# Patient Record
Sex: FEMALE | Race: WHITE | NOT HISPANIC OR LATINO | Employment: OTHER | URBAN - METROPOLITAN AREA
[De-identification: names, ages, dates, MRNs, and addresses within clinical notes are randomized per-mention and may not be internally consistent; named-entity substitution may affect disease eponyms.]

---

## 2017-01-18 ENCOUNTER — TELEPHONE (OUTPATIENT)
Dept: INTERNAL MEDICINE | Facility: CLINIC | Age: 67
End: 2017-01-18

## 2017-01-18 DIAGNOSIS — E78.5 HYPERLIPIDEMIA, UNSPECIFIED HYPERLIPIDEMIA TYPE: ICD-10-CM

## 2017-01-18 RX ORDER — ATORVASTATIN CALCIUM 10 MG/1
10 TABLET, FILM COATED ORAL DAILY
Qty: 90 TABLET | Refills: 3 | Status: SHIPPED | OUTPATIENT
Start: 2017-01-18 | End: 2018-02-14 | Stop reason: SDUPTHER

## 2017-01-19 NOTE — TELEPHONE ENCOUNTER
Sent 90 day Rx for a year to her pharmacy per request from her employers [employer] generation Rx that maintenance meds be filled with 90 day prescriptions.

## 2017-02-07 DIAGNOSIS — F32.A DEPRESSION: ICD-10-CM

## 2017-02-07 RX ORDER — FLUOXETINE HYDROCHLORIDE 40 MG/1
CAPSULE ORAL
Qty: 90 CAPSULE | Refills: 3 | Status: SHIPPED | OUTPATIENT
Start: 2017-02-07 | End: 2018-02-05 | Stop reason: SDUPTHER

## 2017-02-11 DIAGNOSIS — E11.9 DIABETES MELLITUS TYPE 2, CONTROLLED: ICD-10-CM

## 2017-02-12 RX ORDER — METFORMIN HYDROCHLORIDE 500 MG/1
TABLET, EXTENDED RELEASE ORAL
Qty: 360 TABLET | Refills: 1 | Status: SHIPPED | OUTPATIENT
Start: 2017-02-12 | End: 2017-02-17 | Stop reason: SDUPTHER

## 2017-02-17 DIAGNOSIS — E11.9 DIABETES MELLITUS TYPE 2, CONTROLLED: ICD-10-CM

## 2017-02-17 RX ORDER — METFORMIN HYDROCHLORIDE 500 MG/1
TABLET, EXTENDED RELEASE ORAL
Qty: 360 TABLET | Refills: 0 | Status: SHIPPED | OUTPATIENT
Start: 2017-02-17 | End: 2017-05-05 | Stop reason: SDUPTHER

## 2017-03-27 DIAGNOSIS — E11.9 TYPE II OR UNSPECIFIED TYPE DIABETES MELLITUS WITHOUT MENTION OF COMPLICATION, NOT STATED AS UNCONTROLLED: ICD-10-CM

## 2017-03-27 RX ORDER — LINAGLIPTIN 5 MG/1
TABLET, FILM COATED ORAL
Qty: 90 TABLET | Refills: 1 | OUTPATIENT
Start: 2017-03-27

## 2017-05-05 DIAGNOSIS — E11.9 DIABETES MELLITUS TYPE 2, CONTROLLED: ICD-10-CM

## 2017-05-05 RX ORDER — METFORMIN HYDROCHLORIDE 500 MG/1
TABLET, EXTENDED RELEASE ORAL
Qty: 360 TABLET | Refills: 0 | Status: SHIPPED | OUTPATIENT
Start: 2017-05-05 | End: 2017-08-20 | Stop reason: SDUPTHER

## 2017-05-05 RX ORDER — CANAGLIFLOZIN 100 MG/1
TABLET, FILM COATED ORAL
Qty: 90 TABLET | Refills: 1 | Status: SHIPPED | OUTPATIENT
Start: 2017-05-05 | End: 2017-06-20 | Stop reason: SDUPTHER

## 2017-05-05 RX ORDER — LINAGLIPTIN 5 MG/1
TABLET, FILM COATED ORAL
Qty: 90 TABLET | Refills: 1 | Status: SHIPPED | OUTPATIENT
Start: 2017-05-05 | End: 2017-11-06 | Stop reason: SDUPTHER

## 2017-05-16 ENCOUNTER — CLINICAL SUPPORT (OUTPATIENT)
Dept: INTERNAL MEDICINE | Facility: CLINIC | Age: 67
End: 2017-05-16
Payer: MEDICARE

## 2017-05-16 DIAGNOSIS — E11.9 TYPE 2 DIABETES MELLITUS WITHOUT COMPLICATIONS: ICD-10-CM

## 2017-05-16 LAB
ALBUMIN SERPL BCP-MCNC: 3.7 G/DL
ALP SERPL-CCNC: 68 U/L
ALT SERPL W/O P-5'-P-CCNC: 14 U/L
ANION GAP SERPL CALC-SCNC: 12 MMOL/L
AST SERPL-CCNC: 17 U/L
BASOPHILS # BLD AUTO: 0.03 K/UL
BASOPHILS NFR BLD: 0.3 %
BILIRUB SERPL-MCNC: 0.4 MG/DL
BUN SERPL-MCNC: 11 MG/DL
CALCIUM SERPL-MCNC: 9.8 MG/DL
CHLORIDE SERPL-SCNC: 107 MMOL/L
CO2 SERPL-SCNC: 26 MMOL/L
CREAT SERPL-MCNC: 0.8 MG/DL
DIFFERENTIAL METHOD: ABNORMAL
EOSINOPHIL # BLD AUTO: 0.2 K/UL
EOSINOPHIL NFR BLD: 2.4 %
ERYTHROCYTE [DISTWIDTH] IN BLOOD BY AUTOMATED COUNT: 14.5 %
EST. GFR  (AFRICAN AMERICAN): >60 ML/MIN/1.73 M^2
EST. GFR  (NON AFRICAN AMERICAN): >60 ML/MIN/1.73 M^2
GLUCOSE SERPL-MCNC: 115 MG/DL
HCT VFR BLD AUTO: 43.1 %
HGB BLD-MCNC: 13.6 G/DL
LYMPHOCYTES # BLD AUTO: 2.1 K/UL
LYMPHOCYTES NFR BLD: 21.4 %
MCH RBC QN AUTO: 28.2 PG
MCHC RBC AUTO-ENTMCNC: 31.6 %
MCV RBC AUTO: 89 FL
MONOCYTES # BLD AUTO: 0.6 K/UL
MONOCYTES NFR BLD: 6 %
NEUTROPHILS # BLD AUTO: 6.8 K/UL
NEUTROPHILS NFR BLD: 69.7 %
PLATELET # BLD AUTO: 238 K/UL
PMV BLD AUTO: 11.4 FL
POTASSIUM SERPL-SCNC: 4.8 MMOL/L
PROT SERPL-MCNC: 7.1 G/DL
RBC # BLD AUTO: 4.83 M/UL
SODIUM SERPL-SCNC: 145 MMOL/L
WBC # BLD AUTO: 9.82 K/UL

## 2017-05-16 PROCEDURE — 85025 COMPLETE CBC W/AUTO DIFF WBC: CPT

## 2017-05-16 PROCEDURE — 83036 HEMOGLOBIN GLYCOSYLATED A1C: CPT

## 2017-05-16 PROCEDURE — 80053 COMPREHEN METABOLIC PANEL: CPT

## 2017-05-17 LAB
ESTIMATED AVG GLUCOSE: 123 MG/DL
HBA1C MFR BLD HPLC: 5.9 %

## 2017-05-23 DIAGNOSIS — E11.9 DIABETES MELLITUS TYPE 2, CONTROLLED: ICD-10-CM

## 2017-05-23 RX ORDER — METFORMIN HYDROCHLORIDE 500 MG/1
TABLET, EXTENDED RELEASE ORAL
Qty: 360 TABLET | Refills: 0 | OUTPATIENT
Start: 2017-05-23

## 2017-06-20 ENCOUNTER — OFFICE VISIT (OUTPATIENT)
Dept: INTERNAL MEDICINE | Facility: CLINIC | Age: 67
End: 2017-06-20
Payer: MEDICARE

## 2017-06-20 VITALS
SYSTOLIC BLOOD PRESSURE: 123 MMHG | WEIGHT: 188.25 LBS | BODY MASS INDEX: 26.35 KG/M2 | HEIGHT: 71 IN | OXYGEN SATURATION: 95 % | TEMPERATURE: 97 F | DIASTOLIC BLOOD PRESSURE: 75 MMHG | HEART RATE: 88 BPM

## 2017-06-20 DIAGNOSIS — M79.672 LEFT FOOT PAIN: ICD-10-CM

## 2017-06-20 DIAGNOSIS — Z23 IMMUNIZATION DUE: ICD-10-CM

## 2017-06-20 DIAGNOSIS — E11.9 TYPE 2 DIABETES MELLITUS WITHOUT COMPLICATION, WITHOUT LONG-TERM CURRENT USE OF INSULIN: Primary | ICD-10-CM

## 2017-06-20 DIAGNOSIS — E78.5 HYPERLIPIDEMIA, UNSPECIFIED HYPERLIPIDEMIA TYPE: ICD-10-CM

## 2017-06-20 DIAGNOSIS — M85.80 OSTEOPENIA, UNSPECIFIED LOCATION: ICD-10-CM

## 2017-06-20 PROCEDURE — 90732 PPSV23 VACC 2 YRS+ SUBQ/IM: CPT | Mod: PBBFAC,PO

## 2017-06-20 PROCEDURE — 99213 OFFICE O/P EST LOW 20 MIN: CPT | Mod: PBBFAC,PO | Performed by: FAMILY MEDICINE

## 2017-06-20 PROCEDURE — 1159F MED LIST DOCD IN RCRD: CPT | Mod: ,,, | Performed by: FAMILY MEDICINE

## 2017-06-20 PROCEDURE — 1126F AMNT PAIN NOTED NONE PRSNT: CPT | Mod: ,,, | Performed by: FAMILY MEDICINE

## 2017-06-20 PROCEDURE — 99999 PR PBB SHADOW E&M-EST. PATIENT-LVL III: CPT | Mod: PBBFAC,,, | Performed by: FAMILY MEDICINE

## 2017-06-20 PROCEDURE — 3044F HG A1C LEVEL LT 7.0%: CPT | Mod: ,,, | Performed by: FAMILY MEDICINE

## 2017-06-20 PROCEDURE — 99214 OFFICE O/P EST MOD 30 MIN: CPT | Mod: S$PBB,,, | Performed by: FAMILY MEDICINE

## 2017-06-20 RX ORDER — ACETAMINOPHEN 500 MG
1 TABLET ORAL DAILY
COMMUNITY
End: 2018-08-20

## 2017-06-20 RX ORDER — AMOXICILLIN 500 MG
2 CAPSULE ORAL DAILY
COMMUNITY
End: 2018-08-20

## 2017-06-20 NOTE — PROGRESS NOTES
Subjective:       Patient ID: Ynes Diaz is a 67 y.o. female.    Chief Complaint: discuss labs    Type 2 DM, HLD, familial neuropathy, depression. She is here to review her 6 month labs.  Lab Results       Component                Value               Date                       WBC                      9.82                05/16/2017                 HGB                      13.6                05/16/2017                 HCT                      43.1                05/16/2017                 PLT                      238                 05/16/2017                 CHOL                     157                 11/07/2016                 TRIG                     154 (H)             11/07/2016                 HDL                      38 (L)              11/07/2016                 LDLCALC                  88.2                11/07/2016                 ALT                      14                  05/16/2017                 AST                      17                  05/16/2017                 NA                       145                 05/16/2017                 K                        4.8                 05/16/2017                 CL                       107                 05/16/2017                 CALCIUM                  9.8                 05/16/2017                 CREATININE               0.8                 05/16/2017                 BUN                      11                  05/16/2017                 CO2                      26                  05/16/2017                 GLU                      115 (H)             05/16/2017                 ESTGFRAFRICA             >60.0               05/16/2017                 EGFRNONAA                >60.0               05/16/2017                 HGBA1C                   5.9                 05/16/2017                 MICALBCREAT                                  11/07/2016             Unable to calculate  A1C dwon from 6.2 to 5.9. On metformin, tradjenta and invokana.no  low BSs, no med SEs.    She had a BMD last fall and the results were never reviewed with pt. Testing rfeviewed in detail. She has T score to -1.1 at worst in femoral neck. No prior Vit D check      Review of Systems   Constitutional: Negative for fever.   Gastrointestinal: Negative for diarrhea.   Musculoskeletal: Positive for arthralgias (left foot).   Psychiatric/Behavioral: Negative for sleep disturbance.       Objective:      Physical Exam   Constitutional: She is oriented to person, place, and time. She appears well-developed and well-nourished.   HENT:   Head: Normocephalic and atraumatic.   Right Ear: External ear normal.   Left Ear: External ear normal.   Mouth/Throat: Oropharynx is clear and moist. No oropharyngeal exudate.   Neck: Normal range of motion. Neck supple.   Cardiovascular: Normal rate, regular rhythm and normal heart sounds.    Pulses:       Dorsalis pedis pulses are 2+ on the right side, and 2+ on the left side.        Posterior tibial pulses are 1+ on the right side, and 1+ on the left side.   Pulmonary/Chest: Effort normal and breath sounds normal.   Abdominal: Soft. Bowel sounds are normal. She exhibits no distension. There is no tenderness.   Musculoskeletal:        Right foot: There is normal range of motion and no deformity.        Left foot: There is normal range of motion and no deformity.   TTP to left prox 5th metatarsal laterally   Feet:   Right Foot:   Protective Sensation: 10 sites tested. 10 sites sensed.   Skin Integrity: Negative for ulcer or skin breakdown.   Left Foot:   Protective Sensation: 10 sites tested. 10 sites sensed.   Skin Integrity: Negative for ulcer or skin breakdown.   Neurological: She is alert and oriented to person, place, and time.   Skin: Skin is warm and dry.   Psychiatric: She has a normal mood and affect. Her behavior is normal.         Assessment/Plan:     1. Type 2 diabetes mellitus without complication, without long-term current use of insulin   canagliflozin (INVOKANA) 100 mg Tab    Hemoglobin A1c    Microalbumin/creatinine urine ratio    Comprehensive metabolic panel    Ambulatory referral to Podiatry   2. Hyperlipidemia, unspecified hyperlipidemia type  Lipid panel   3. Immunization due  Pneumococcal Polysaccharide Vaccine (23 Valent) (SQ/IM)   4. Osteopenia, unspecified location  Vitamin D   5. Left foot pain  Ambulatory referral to Podiatry     Reviewed BMD in depth with recs - will get Vit D level with next draw - calcium recs given.  Excellent control DM2 lipids, BP not elevated.

## 2017-07-10 ENCOUNTER — HOSPITAL ENCOUNTER (OUTPATIENT)
Dept: RADIOLOGY | Facility: HOSPITAL | Age: 67
Discharge: HOME | End: 2017-07-10
Attending: PODIATRIST
Payer: MEDICARE

## 2017-07-10 ENCOUNTER — OFFICE VISIT (OUTPATIENT)
Dept: PODIATRY | Facility: CLINIC | Age: 67
End: 2017-07-10
Payer: MEDICARE

## 2017-07-10 VITALS
BODY MASS INDEX: 26.35 KG/M2 | WEIGHT: 188.25 LBS | DIASTOLIC BLOOD PRESSURE: 69 MMHG | HEIGHT: 71 IN | HEART RATE: 83 BPM | SYSTOLIC BLOOD PRESSURE: 108 MMHG

## 2017-07-10 DIAGNOSIS — M86.9 PERIOSTITIS OF ANKLE AND FOOT: ICD-10-CM

## 2017-07-10 DIAGNOSIS — M79.672 LEFT FOOT PAIN: ICD-10-CM

## 2017-07-10 DIAGNOSIS — E11.9 COMPREHENSIVE DIABETIC FOOT EXAMINATION, TYPE 2 DM, ENCOUNTER FOR: Primary | ICD-10-CM

## 2017-07-10 PROCEDURE — 73630 X-RAY EXAM OF FOOT: CPT | Mod: TC,LT

## 2017-07-10 PROCEDURE — 99999 PR PBB SHADOW E&M-EST. PATIENT-LVL III: CPT | Mod: PBBFAC,,, | Performed by: PODIATRIST

## 2017-07-10 PROCEDURE — 1159F MED LIST DOCD IN RCRD: CPT | Mod: ,,, | Performed by: PODIATRIST

## 2017-07-10 PROCEDURE — 99214 OFFICE O/P EST MOD 30 MIN: CPT | Mod: S$PBB,,, | Performed by: PODIATRIST

## 2017-07-10 PROCEDURE — 3044F HG A1C LEVEL LT 7.0%: CPT | Mod: ,,, | Performed by: PODIATRIST

## 2017-07-10 PROCEDURE — 73630 X-RAY EXAM OF FOOT: CPT | Mod: 26,LT,, | Performed by: RADIOLOGY

## 2017-07-10 PROCEDURE — 99213 OFFICE O/P EST LOW 20 MIN: CPT | Mod: PBBFAC,25 | Performed by: PODIATRIST

## 2017-07-10 PROCEDURE — 1125F AMNT PAIN NOTED PAIN PRSNT: CPT | Mod: ,,, | Performed by: PODIATRIST

## 2017-07-10 NOTE — PROGRESS NOTES
Subjective:     Patient ID: Ynes Diaz is a 67 y.o. female.    Chief Complaint: Foot Pain (left lateral side of foot pain worse when walking occasional swelling)    Ynes is a 67 y.o. female who presents to the clinic upon referral from Dr. Terrazas  for evaluation and treatment of diabetic feet. Ynes has a past medical history of Colon polyps; Depression; Hyperlipidemia; Melanoma; Peripheral neuropathy; and Type II or unspecified type diabetes mellitus without mention of complication, not stated as uncontrolled. Patient relates no major problem with feet. Only complaints today consist of left foot pain. Patient point to left lateral foot. Patient rates pain at worse 9/10.    PCP: Cortney Terrazas MD    Date Last Seen by PCP: 6/20/17    Current shoe gear: Tennis shoes    Hemoglobin A1C   Date Value Ref Range Status   05/16/2017 5.9 4.5 - 6.2 % Final     Comment:     According to ADA guidelines, hemoglobin A1C <7.0% represents  optimal control in non-pregnant diabetic patients.  Different  metrics may apply to specific populations.   Standards of Medical Care in Diabetes - 2016.  For the purpose of screening for the presence of diabetes:  <5.7%     Consistent with the absence of diabetes  5.7-6.4%  Consistent with increasing risk for diabetes   (prediabetes)  >or=6.5%  Consistent with diabetes  Currently no consensus exists for use of hemoglobin A1C  for diagnosis of diabetes for children.     11/07/2016 6.2 4.5 - 6.2 % Final     Comment:     According to ADA guidelines, hemoglobin A1C <7.0% represents  optimal control in non-pregnant diabetic patients.  Different  metrics may apply to specific populations.   Standards of Medical Care in Diabetes - 2016.  For the purpose of screening for the presence of diabetes:  <5.7%     Consistent with the absence of diabetes  5.7-6.4%  Consistent with increasing risk for diabetes   (prediabetes)  >or=6.5%  Consistent with diabetes  Currently no consensus exists for use of  hemoglobin A1C  for diagnosis of diabetes for children.     05/11/2016 5.7 4.5 - 6.2 % Final               Patient Active Problem List   Diagnosis    Type II or unspecified type diabetes mellitus without mention of complication, not stated as uncontrolled    Hyperlipidemia    Colon polyps    Depression    Melanoma    Familial peripheral neuropathy       Medication List with Changes/Refills   Current Medications    ATORVASTATIN (LIPITOR) 10 MG TABLET    Take 1 tablet (10 mg total) by mouth once daily.    BIOTIN ORAL    Take 1 tablet by mouth once daily.    CANAGLIFLOZIN (INVOKANA) 100 MG TAB    Take 1 tablet (100 mg total) by mouth once daily.    CHOLECALCIFEROL, VITAMIN D3, (VITAMIN D3) 2,000 UNIT CAP    Take 1 capsule by mouth once daily.    FISH OIL-OMEGA-3 FATTY ACIDS 300-1,000 MG CAPSULE    Take 2 g by mouth once daily.    FLUOXETINE (PROZAC) 40 MG CAPSULE    TAKE ONE CAPSULE BY MOUTH ONE TIME DAILY    FLUTICASONE (FLONASE) 50 MCG/ACTUATION NASAL SPRAY    2 sprays by Each Nare route as needed.    GABAPENTIN (NEURONTIN) 600 MG TABLET    Take 600 mg by mouth once daily. Takes one daily    GRALISE 600 MG TB24    TAKE 3 TABLETS (1,800 MG) BY MOUTH DAILY.    IBUPROFEN (ADVIL,MOTRIN) 600 MG TABLET    Take 1 tablet (600 mg total) by mouth 3 (three) times daily as needed for Pain. Take with food.    METFORMIN (GLUCOPHAGE-XR) 500 MG 24 HR TABLET    TAKE TWO TABLETS BY MOUTH TWICE DAILY WITH MEALS    MULTIVITAMIN (ONE DAILY MULTIVITAMIN) PER TABLET    Take 1 tablet by mouth once daily.    TRADJENTA 5 MG TAB TABLET    TAKE 1 TABLET (5 MG TOTAL) BY MOUTH ONCE DAILY.       Review of patient's allergies indicates:  No Known Allergies    Past Surgical History:   Procedure Laterality Date    BASAL CELL CARCINOMA EXCISION  2011    [facility]     CATARACT EXTRACTION W/ INTRAOCULAR LENS  IMPLANT, BILATERAL Bilateral 2014    COLONOSCOPY  10/2013    1 POLYP, REPEAT 3-5 YRS    COLONOSCOPY      2006, 2010 MULTIPLE POLYPS     "COLONOSCOPY  12/21/2016    DR FERNANDO STUBBS/ POLYP TRANSVERSE COLON    DILATION AND CURETTAGE OF UTERUS      NASAL RECONSTRUCTION  11/2011    SKIN CANCER EXCISION  2011    melanoma back 08/2011    TONSILLECTOMY, ADENOIDECTOMY         Family History   Problem Relation Age of Onset    Adopted: Yes       Social History     Social History    Marital status: Single     Spouse name: N/A    Number of children: 1    Years of education: N/A     Occupational History          health insurance co    retired from claims processing director      [employer]     Social History Main Topics    Smoking status: Former Smoker     Types: Cigarettes    Smokeless tobacco: Never Used    Alcohol use 0.6 oz/week     1 Glasses of wine per week    Drug use: No    Sexual activity: Not Currently     Partners: Male     Other Topics Concern    Not on file     Social History Narrative    No narrative on file       Vitals:    07/10/17 0938   BP: 108/69   Pulse: 83   Weight: 85.4 kg (188 lb 4.4 oz)   Height: 5' 11" (1.803 m)   PainSc:   9   PainLoc: Foot       Hemoglobin A1C   Date Value Ref Range Status   05/16/2017 5.9 4.5 - 6.2 % Final     Comment:     According to ADA guidelines, hemoglobin A1C <7.0% represents  optimal control in non-pregnant diabetic patients.  Different  metrics may apply to specific populations.   Standards of Medical Care in Diabetes - 2016.  For the purpose of screening for the presence of diabetes:  <5.7%     Consistent with the absence of diabetes  5.7-6.4%  Consistent with increasing risk for diabetes   (prediabetes)  >or=6.5%  Consistent with diabetes  Currently no consensus exists for use of hemoglobin A1C  for diagnosis of diabetes for children.     11/07/2016 6.2 4.5 - 6.2 % Final     Comment:     According to ADA guidelines, hemoglobin A1C <7.0% represents  optimal control in non-pregnant diabetic patients.  Different  metrics may apply to specific populations.   Standards of Medical " Care in Diabetes - 2016.  For the purpose of screening for the presence of diabetes:  <5.7%     Consistent with the absence of diabetes  5.7-6.4%  Consistent with increasing risk for diabetes   (prediabetes)  >or=6.5%  Consistent with diabetes  Currently no consensus exists for use of hemoglobin A1C  for diagnosis of diabetes for children.     05/11/2016 5.7 4.5 - 6.2 % Final       Review of Systems   Constitutional: Negative for chills and fever.   Respiratory: Negative for shortness of breath.    Cardiovascular: Negative for chest pain, palpitations, orthopnea, claudication and leg swelling.   Gastrointestinal: Negative for diarrhea, nausea and vomiting.   Musculoskeletal: Negative for joint pain.   Skin: Negative for rash.   Neurological: Positive for tingling. Negative for dizziness, sensory change, focal weakness and weakness.   Psychiatric/Behavioral: Negative.              Objective:      PHYSICAL EXAM: Apperance: Alert and orient in no distress,well developed, and with good attention to grooming and body habits  Patient presents ambulating in tennis shoes.   LOWER EXTREMITY EXAM:  VASCULAR: Dorsalis pedis pulses 2/4 bilateral and Posterior Tibial pulses 2/4 bilateral. Capillary fill time <4 seconds bilateral. No edema observed bilateral. Varicosities absent bilateral. Skin temperature of the lower extremities is warm to warm, proximal to distal. Hair growth WNL bilateral.  DERMATOLOGICAL: No skin rashes, subcutaneous nodules, lesions, or ulcers observed bilateral. Nails 1,2,3,4,5 bilateral normal length and thickness. Webspaces 1-4 clean, dry and without evidence of break in skin integrity bilateral.   NEUROLOGICAL: Light touch, sharp-dull, proprioception all present and equal bilaterally.  Vibratory sensation diminished at bilateral hallux. Protective sensation intact at all 10 sites as tested with a [place]-Rachel 5.07 monofilament.   MUSCULOSKELETAL: Muscle strength is 5/5 for foot inverters, everters,  plantarflexors, and dorsiflexors. Muscle tone is normal. (+) pain on palpation of left lateral foot at the metatarsocuboid and along the metatarsal shaft area. No pain on palpation of the peroneal tendons or tarsal tunnel.       TEST RESULTS: Radiographs of left foot reveals there is no evidence to suggest acute fracture or dislocation.  There is a questionable old fracture involving the midshaft of the 4th proximal phalanx.  There is mild degenerative change present at the 1st MTP joint.  Moderate degenerative changes noted in the region of the midfoot.  Dorsal and plantar calcaneal enthesophytes are present.          Assessment:       Encounter Diagnoses   Name Primary?    Comprehensive diabetic foot examination, type 2 DM, encounter for Yes    Periostitis of ankle and foot - Left Foot     Left foot pain          Plan:   Comprehensive diabetic foot examination, type 2 DM, encounter for    Periostitis of ankle and foot - Left Foot    Left foot pain  -     X-Ray Foot Complete Left; Future; Expected date: 07/10/2017      I counseled the patient on her conditions, regarding findings of my examination, my impressions, and usual treatment plan.   Ordered and reviewed left foot x-rays in exam room with patient.   Patient instructed on adequate icing techniques. Patient should ice the affected area at least once per day x 10 minutes for 10 days . I advised the  patient that extra icing would also be beneficial to ensure adequate anti inflammatory effect.   The patient and I reviewed the types of shoes she should be wearing, my recommendation includes generally the best time of the day for a shoe fitting is the afternoon, shoes with a wide toe box, very good cushion, and tennis shoes with removable inner soles.The patient and I reviewed my recommendations for over-the-counter orthotic inserts.   Greater than 50% of this visit spent on counseling and coordination of care.  Greater than 20 minutes spent on education about  the diabetic foot, neuropathy, and prevention of limb loss.  Shoe inspection. Diabetic Foot Education. Patient reminded of the importance of good nutrition and blood sugar control to help prevent podiatric complications of diabetes. Patient instructed on proper foot hygeine. We discussed wearing proper shoe gear, daily foot inspections, never walking without protective shoe gear, never putting sharp instruments to feet.    Patient  will continue to monitor the areas daily, inspect feet, wear protective shoe gear when ambulatory, moisturizer to maintain skin integrity. Patient reminded of the importance of good nutrition and blood sugar control to help prevent podiatric complications of diabetes.  Patient to return 12 months or sooner if needed.             Misty Lyons DPM  [facility]

## 2017-07-10 NOTE — LETTER
July 13, 2017      Cortney Terrazas MD  [address] [place] [address] [address] [id number]           [facility]  [place] [address]  Phone: [phone]  Fax: [phone]          Patient: Ynes Diaz   MR Number: [id number]   YOB: 1950   Date of Visit: 7/10/2017       Dear Dr. Cortney Terrazas:    Thank you for referring Ynes Diaz to me for evaluation. Attached you will find relevant portions of my assessment and plan of care.    If you have questions, please do not hesitate to call me. I look forward to following Ynes Diaz along with you.    Sincerely,    Mila Mae  CC:  No Recipients    If you would like to receive this communication electronically, please contact [email or web address] or [phone] to request more information on [employer] access.    For providers and/or their staff who would like to refer a patient to [facility], please contact us through our one-stop-shop provider referral line, [facility] , at [phone].    If you feel you have received this communication in error or would no longer like to receive these types of communications, please e-mail [email or web address]

## 2017-08-20 DIAGNOSIS — E11.9 DIABETES MELLITUS TYPE 2, CONTROLLED: ICD-10-CM

## 2017-08-21 RX ORDER — METFORMIN HYDROCHLORIDE 500 MG/1
TABLET, EXTENDED RELEASE ORAL
Qty: 360 TABLET | Refills: 3 | Status: SHIPPED | OUTPATIENT
Start: 2017-08-21 | End: 2018-08-13 | Stop reason: SDUPTHER

## 2017-10-12 DIAGNOSIS — E78.5 HYPERLIPIDEMIA, UNSPECIFIED HYPERLIPIDEMIA TYPE: ICD-10-CM

## 2017-10-12 RX ORDER — ATORVASTATIN CALCIUM 10 MG/1
10 TABLET, FILM COATED ORAL DAILY
Qty: 90 TABLET | Refills: 3 | OUTPATIENT
Start: 2017-10-12

## 2017-10-12 NOTE — TELEPHONE ENCOUNTER
Pt [facility] is requesting a 90 day refill on her medication (atorvastatin 10 mg) pt last seen on 6/20/17.

## 2017-11-06 RX ORDER — LINAGLIPTIN 5 MG/1
TABLET, FILM COATED ORAL
Qty: 90 TABLET | Refills: 0 | Status: SHIPPED | OUTPATIENT
Start: 2017-11-06 | End: 2018-02-05 | Stop reason: SDUPTHER

## 2017-12-06 ENCOUNTER — CLINICAL SUPPORT (OUTPATIENT)
Dept: INTERNAL MEDICINE | Facility: CLINIC | Age: 67
End: 2017-12-06
Payer: MEDICARE

## 2017-12-06 DIAGNOSIS — M85.80 OSTEOPENIA, UNSPECIFIED LOCATION: ICD-10-CM

## 2017-12-06 DIAGNOSIS — E78.5 HYPERLIPIDEMIA, UNSPECIFIED HYPERLIPIDEMIA TYPE: ICD-10-CM

## 2017-12-06 DIAGNOSIS — E11.9 TYPE 2 DIABETES MELLITUS WITHOUT COMPLICATION, WITHOUT LONG-TERM CURRENT USE OF INSULIN: ICD-10-CM

## 2017-12-06 LAB
25(OH)D3+25(OH)D2 SERPL-MCNC: 32 NG/ML
ALBUMIN SERPL BCP-MCNC: 3.8 G/DL
ALP SERPL-CCNC: 73 U/L
ALT SERPL W/O P-5'-P-CCNC: 13 U/L
ANION GAP SERPL CALC-SCNC: 11 MMOL/L
AST SERPL-CCNC: 13 U/L
BILIRUB SERPL-MCNC: 0.5 MG/DL
BUN SERPL-MCNC: 16 MG/DL
CALCIUM SERPL-MCNC: 8.8 MG/DL
CHLORIDE SERPL-SCNC: 103 MMOL/L
CHOLEST SERPL-MCNC: 175 MG/DL
CHOLEST/HDLC SERPL: 3.2 {RATIO}
CO2 SERPL-SCNC: 30 MMOL/L
CREAT SERPL-MCNC: 0.8 MG/DL
CREAT UR-MCNC: 142 MG/DL
EST. GFR  (AFRICAN AMERICAN): >60 ML/MIN/1.73 M^2
EST. GFR  (NON AFRICAN AMERICAN): >60 ML/MIN/1.73 M^2
ESTIMATED AVG GLUCOSE: 111 MG/DL
GLUCOSE SERPL-MCNC: 100 MG/DL
HBA1C MFR BLD HPLC: 5.5 %
HDLC SERPL-MCNC: 54 MG/DL
HDLC SERPL: 30.9 %
LDLC SERPL CALC-MCNC: 91.8 MG/DL
MICROALBUMIN UR DL<=1MG/L-MCNC: 9 UG/ML
MICROALBUMIN/CREATININE RATIO: 6.3 UG/MG
NONHDLC SERPL-MCNC: 121 MG/DL
POTASSIUM SERPL-SCNC: 4.9 MMOL/L
PROT SERPL-MCNC: 7.7 G/DL
SODIUM SERPL-SCNC: 144 MMOL/L
TRIGL SERPL-MCNC: 146 MG/DL

## 2017-12-06 PROCEDURE — 83036 HEMOGLOBIN GLYCOSYLATED A1C: CPT

## 2017-12-06 PROCEDURE — 82306 VITAMIN D 25 HYDROXY: CPT

## 2017-12-06 PROCEDURE — 80061 LIPID PANEL: CPT

## 2017-12-06 PROCEDURE — 80053 COMPREHEN METABOLIC PANEL: CPT

## 2017-12-06 PROCEDURE — 82570 ASSAY OF URINE CREATININE: CPT

## 2017-12-06 PROCEDURE — 99211 OFF/OP EST MAY X REQ PHY/QHP: CPT | Mod: PBBFAC,PO

## 2017-12-06 PROCEDURE — 99999 PR PBB SHADOW E&M-EST. PATIENT-LVL I: CPT | Mod: PBBFAC,,,

## 2017-12-14 ENCOUNTER — OFFICE VISIT (OUTPATIENT)
Dept: INTERNAL MEDICINE | Facility: CLINIC | Age: 67
End: 2017-12-14
Payer: MEDICARE

## 2017-12-14 VITALS
HEART RATE: 89 BPM | BODY MASS INDEX: 24.57 KG/M2 | DIASTOLIC BLOOD PRESSURE: 63 MMHG | WEIGHT: 175.5 LBS | OXYGEN SATURATION: 95 % | TEMPERATURE: 98 F | HEIGHT: 71 IN | SYSTOLIC BLOOD PRESSURE: 104 MMHG

## 2017-12-14 DIAGNOSIS — M85.80 OSTEOPENIA, UNSPECIFIED LOCATION: ICD-10-CM

## 2017-12-14 DIAGNOSIS — E78.5 HYPERLIPIDEMIA, UNSPECIFIED HYPERLIPIDEMIA TYPE: ICD-10-CM

## 2017-12-14 DIAGNOSIS — E11.9 TYPE 2 DIABETES MELLITUS WITHOUT COMPLICATION, WITHOUT LONG-TERM CURRENT USE OF INSULIN: Primary | ICD-10-CM

## 2017-12-14 DIAGNOSIS — Z12.31 ENCOUNTER FOR SCREENING MAMMOGRAM FOR BREAST CANCER: ICD-10-CM

## 2017-12-14 PROCEDURE — 99213 OFFICE O/P EST LOW 20 MIN: CPT | Mod: PBBFAC,PO | Performed by: FAMILY MEDICINE

## 2017-12-14 PROCEDURE — 99214 OFFICE O/P EST MOD 30 MIN: CPT | Mod: S$PBB,,, | Performed by: FAMILY MEDICINE

## 2017-12-14 PROCEDURE — 99999 PR PBB SHADOW E&M-EST. PATIENT-LVL III: CPT | Mod: PBBFAC,,, | Performed by: FAMILY MEDICINE

## 2017-12-14 RX ORDER — ATORVASTATIN CALCIUM 10 MG/1
10 TABLET, FILM COATED ORAL DAILY
Qty: 90 TABLET | Refills: 3 | Status: CANCELLED | OUTPATIENT
Start: 2017-12-14

## 2017-12-14 NOTE — PROGRESS NOTES
Subjective:       Patient ID: Ynes Diaz is a 67 y.o. female.    Chief Complaint: discuss labs and Medication Refill    Here for recheck  With labs which are reviewed in detail - lipids have increased still on lipitor 10.  Lab Results       Component                Value               Date                       WBC                      9.82                05/16/2017                 HGB                      13.6                05/16/2017                 HCT                      43.1                05/16/2017                 PLT                      238                 05/16/2017                 CHOL                     175                 12/06/2017                 TRIG                     146                 12/06/2017                 HDL                      54                  12/06/2017                 LDLCALC                  91.8                12/06/2017                 ALT                      13                  12/06/2017                 AST                      13                  12/06/2017                 NA                       144                 12/06/2017                 K                        4.9                 12/06/2017                 CL                       103                 12/06/2017                 CALCIUM                  8.8                 12/06/2017                 CREATININE               0.8                 12/06/2017                 BUN                      16                  12/06/2017                 CO2                      30 (H)              12/06/2017                 GLU                      100                 12/06/2017                 ESTGFRAFRICA             >60.0               12/06/2017                 EGFRNONAA                >60.0               12/06/2017                 HGBA1C                   5.5                 12/06/2017                 MICALBCREAT              6.3                 12/06/2017            A1c excellent.  No microalbuminuria.  She has lost  "weight as part of "[employer]" for weight with diet and exercise.  Note almost 20# loss from a yr ago and 8 lbs from last visit. She sts she had lost more but gained some.  It was a 6 wk program - she is exercising 3 x week. Program ended mid-September.        Diabetes   She presents for her follow-up diabetic visit. She has type 2 diabetes mellitus. Her disease course has been improving. There are no hypoglycemic associated symptoms. Associated symptoms include foot paresthesias. Pertinent negatives for diabetes include no chest pain and no fatigue. There are no hypoglycemic complications. Diabetic complications include peripheral neuropathy (familial). Pertinent negatives for diabetic complications include no nephropathy or retinopathy. Current diabetic treatment includes oral agent (triple therapy). She is compliant with treatment all of the time. Her weight is decreasing rapidly. She is following a generally healthy diet. She participates in exercise three times a week. (Infrequent checks - not recall) An ACE inhibitor/angiotensin II receptor blocker is not being taken. She does not see a podiatrist.Eye exam is current.     Review of Systems   Constitutional: Negative for fatigue.   Respiratory: Negative for shortness of breath.    Cardiovascular: Negative for chest pain and leg swelling.   Gastrointestinal: Negative for diarrhea.   Musculoskeletal: Positive for arthralgias (hips/knees).   Psychiatric/Behavioral: Negative for sleep disturbance.       Objective:      Physical Exam   Constitutional: She is oriented to person, place, and time. She appears well-developed.   HENT:   Head: Normocephalic and atraumatic.   Cardiovascular: Normal rate, regular rhythm and normal heart sounds.    Pulmonary/Chest: Effort normal and breath sounds normal.   Neurological: She is alert and oriented to person, place, and time.   Skin: Skin is warm and dry.   Psychiatric: She has a normal mood and affect. Her " behavior is normal.         Assessment/Plan:     1. Type 2 diabetes mellitus without complication, without long-term current use of insulin  Hemoglobin A1c    CBC auto differential    Basic metabolic panel   2. Hyperlipidemia, unspecified hyperlipidemia type     3. Encounter for screening mammogram for breast cancer  Mammo Digital Screening Bilat with CAD   4. Osteopenia, unspecified location      reviewed her 11/2016 bone density results with osteopenia and low risk of fracture.  She would like to get off some of her meds - note she is on metformin, tradjenta and invokana 100. She chooses to stay on current meds for another 6 months.  More than 50% of visit was spent in counseling regarding options, recommendations, medication use, side effects, expectations, and precautions.  Total time spent face-to-face was 25 minutes.

## 2018-02-05 DIAGNOSIS — F32.A DEPRESSION: ICD-10-CM

## 2018-02-05 RX ORDER — FLUOXETINE HYDROCHLORIDE 40 MG/1
CAPSULE ORAL
Qty: 90 CAPSULE | Refills: 3 | Status: SHIPPED | OUTPATIENT
Start: 2018-02-05 | End: 2019-02-11 | Stop reason: SDUPTHER

## 2018-02-05 RX ORDER — LINAGLIPTIN 5 MG/1
TABLET, FILM COATED ORAL
Qty: 90 TABLET | Refills: 3 | Status: SHIPPED | OUTPATIENT
Start: 2018-02-05 | End: 2019-02-11 | Stop reason: SDUPTHER

## 2018-02-14 DIAGNOSIS — E78.5 HYPERLIPIDEMIA, UNSPECIFIED HYPERLIPIDEMIA TYPE: ICD-10-CM

## 2018-02-14 RX ORDER — ATORVASTATIN CALCIUM 10 MG/1
TABLET, FILM COATED ORAL
Qty: 90 TABLET | Refills: 3 | Status: SHIPPED | OUTPATIENT
Start: 2018-02-14 | End: 2019-02-11 | Stop reason: SDUPTHER

## 2018-05-02 LAB
CHOLESTEROL, TOTAL: 171
HBA1C MFR BLD: 5.6 %
HDL/CHOLESTEROL RATIO: 3.3
HDLC SERPL-MCNC: 52 MG/DL
LDLC SERPL CALC-MCNC: 76 MG/DL
TRIGL SERPL-MCNC: 214 MG/DL
VLDLC SERPL-MCNC: 43 MG/DL

## 2018-05-25 ENCOUNTER — PATIENT OUTREACH (OUTPATIENT)
Dept: ADMINISTRATIVE | Facility: HOSPITAL | Age: 68
End: 2018-05-25

## 2018-06-05 DIAGNOSIS — E11.9 TYPE 2 DIABETES MELLITUS WITHOUT COMPLICATION, WITHOUT LONG-TERM CURRENT USE OF INSULIN: ICD-10-CM

## 2018-06-05 RX ORDER — CANAGLIFLOZIN 100 MG/1
TABLET, FILM COATED ORAL
Qty: 90 TABLET | Refills: 0 | Status: SHIPPED | OUTPATIENT
Start: 2018-06-05 | End: 2019-03-01

## 2018-08-13 DIAGNOSIS — E11.9 DIABETES MELLITUS TYPE 2, CONTROLLED: ICD-10-CM

## 2018-08-13 RX ORDER — METFORMIN HYDROCHLORIDE 500 MG/1
TABLET, EXTENDED RELEASE ORAL
Qty: 360 TABLET | Refills: 0 | Status: SHIPPED | OUTPATIENT
Start: 2018-08-13 | End: 2018-11-14 | Stop reason: SDUPTHER

## 2018-08-20 ENCOUNTER — OFFICE VISIT (OUTPATIENT)
Dept: INTERNAL MEDICINE | Facility: CLINIC | Age: 68
End: 2018-08-20
Payer: MEDICARE

## 2018-08-20 VITALS
WEIGHT: 191.25 LBS | OXYGEN SATURATION: 97 % | TEMPERATURE: 98 F | HEART RATE: 68 BPM | DIASTOLIC BLOOD PRESSURE: 69 MMHG | SYSTOLIC BLOOD PRESSURE: 118 MMHG | BODY MASS INDEX: 26.67 KG/M2

## 2018-08-20 DIAGNOSIS — E11.9 TYPE 2 DIABETES MELLITUS WITHOUT COMPLICATION, WITHOUT LONG-TERM CURRENT USE OF INSULIN: ICD-10-CM

## 2018-08-20 DIAGNOSIS — L23.7 POISON IVY DERMATITIS: Primary | ICD-10-CM

## 2018-08-20 PROCEDURE — 99213 OFFICE O/P EST LOW 20 MIN: CPT | Mod: PBBFAC,PO | Performed by: FAMILY MEDICINE

## 2018-08-20 PROCEDURE — 99213 OFFICE O/P EST LOW 20 MIN: CPT | Mod: S$PBB,,, | Performed by: FAMILY MEDICINE

## 2018-08-20 PROCEDURE — 99999 PR PBB SHADOW E&M-EST. PATIENT-LVL III: CPT | Mod: PBBFAC,,, | Performed by: FAMILY MEDICINE

## 2018-08-20 RX ORDER — MUPIROCIN 20 MG/G
OINTMENT TOPICAL 3 TIMES DAILY
COMMUNITY
End: 2019-03-01

## 2018-08-20 RX ORDER — TRIAMCINOLONE ACETONIDE 0.25 MG/G
CREAM TOPICAL 2 TIMES DAILY
Qty: 30 G | Refills: 0 | Status: SHIPPED | OUTPATIENT
Start: 2018-08-20 | End: 2019-03-01 | Stop reason: ALTCHOICE

## 2018-08-20 RX ORDER — METHYLPREDNISOLONE 4 MG/1
TABLET ORAL
Qty: 1 PACKAGE | Refills: 0 | Status: SHIPPED | OUTPATIENT
Start: 2018-08-20 | End: 2018-08-31 | Stop reason: ALTCHOICE

## 2018-08-20 RX ORDER — CLINDAMYCIN HYDROCHLORIDE 300 MG/1
300 CAPSULE ORAL 3 TIMES DAILY
COMMUNITY
End: 2018-08-31 | Stop reason: ALTCHOICE

## 2018-08-20 NOTE — PROGRESS NOTES
Subjective:       Patient ID: Ynes Diaz is a 68 y.o. female.    Chief Complaint: possible Poison [place]    She was seen recently for poison ivy dermatitis involving her face.  She was given a steroid injection placed on prednisone.  She reports some improvement but did have some nervousness from the prednisone.  Since that time she has had more breaking out involving additional areas on the face as well as her arms and forearms and legs.  She reports the rash is itchy at times.  She is diabetic and reports her blood sugar did not increase on prednisone.      Review of Systems   Constitutional: Negative for chills and fever.   Skin: Positive for rash.       Objective:      Physical Exam   Constitutional: She appears well-developed and well-nourished. No distress.   Skin:   Pruritic  papules in a linear distribution involving the right side of her face both arms and both legs.  The face is greater in amount arms and legs is mild degree       Patient Outreach on 05/25/2018   Component Date Value Ref Range Status    Cholesterol, Total 05/02/2018 171   Final    HDL 05/02/2018 52  mg/dL Final    LDL Cholesterol 05/02/2018 76  mg/dL Final    Triglycerides 05/02/2018 214  mg/dL Final    VLDL Cholesterol [place] 05/02/2018 43   Final    HDL/Chol Ratio 05/02/2018 3.3   Final    Hemoglobin A1C 05/02/2018 5.6  % Final     Assessment:       No diagnosis found.    Plan:   The reports and IV dermatitis.  Medrol Dosepak Kenalog cream cool soaks Allegra in the morning Benadryl at night.  Follow-up as needed      There are no diagnoses linked to this encounter.

## 2018-08-21 ENCOUNTER — CLINICAL SUPPORT (OUTPATIENT)
Dept: INTERNAL MEDICINE | Facility: CLINIC | Age: 68
End: 2018-08-21
Payer: MEDICARE

## 2018-08-21 DIAGNOSIS — E11.9 TYPE 2 DIABETES MELLITUS WITHOUT COMPLICATION, WITHOUT LONG-TERM CURRENT USE OF INSULIN: ICD-10-CM

## 2018-08-21 LAB
ANION GAP SERPL CALC-SCNC: 11 MMOL/L
BASOPHILS # BLD AUTO: 0.05 K/UL
BASOPHILS NFR BLD: 0.4 %
BUN SERPL-MCNC: 14 MG/DL
CALCIUM SERPL-MCNC: 10.6 MG/DL
CHLORIDE SERPL-SCNC: 103 MMOL/L
CO2 SERPL-SCNC: 24 MMOL/L
CREAT SERPL-MCNC: 0.9 MG/DL
DIFFERENTIAL METHOD: ABNORMAL
EOSINOPHIL # BLD AUTO: 0.1 K/UL
EOSINOPHIL NFR BLD: 1 %
ERYTHROCYTE [DISTWIDTH] IN BLOOD BY AUTOMATED COUNT: 15 %
EST. GFR  (AFRICAN AMERICAN): >60 ML/MIN/1.73 M^2
EST. GFR  (NON AFRICAN AMERICAN): >60 ML/MIN/1.73 M^2
ESTIMATED AVG GLUCOSE: 126 MG/DL
GLUCOSE SERPL-MCNC: 125 MG/DL
HBA1C MFR BLD HPLC: 6 %
HCT VFR BLD AUTO: 44.2 %
HGB BLD-MCNC: 14.2 G/DL
IMM GRANULOCYTES # BLD AUTO: 0.15 K/UL
IMM GRANULOCYTES NFR BLD AUTO: 1.1 %
LYMPHOCYTES # BLD AUTO: 1.1 K/UL
LYMPHOCYTES NFR BLD: 7.7 %
MCH RBC QN AUTO: 28.2 PG
MCHC RBC AUTO-ENTMCNC: 32.1 G/DL
MCV RBC AUTO: 88 FL
MONOCYTES # BLD AUTO: 0.7 K/UL
MONOCYTES NFR BLD: 4.7 %
NEUTROPHILS # BLD AUTO: 11.8 K/UL
NEUTROPHILS NFR BLD: 85.1 %
NRBC BLD-RTO: 0 /100 WBC
PLATELET # BLD AUTO: 297 K/UL
PMV BLD AUTO: 10.9 FL
POTASSIUM SERPL-SCNC: 5.2 MMOL/L
RBC # BLD AUTO: 5.04 M/UL
SODIUM SERPL-SCNC: 138 MMOL/L
WBC # BLD AUTO: 13.84 K/UL

## 2018-08-21 PROCEDURE — 83036 HEMOGLOBIN GLYCOSYLATED A1C: CPT

## 2018-08-21 PROCEDURE — 85025 COMPLETE CBC W/AUTO DIFF WBC: CPT

## 2018-08-21 PROCEDURE — 80048 BASIC METABOLIC PNL TOTAL CA: CPT

## 2018-08-31 ENCOUNTER — OFFICE VISIT (OUTPATIENT)
Dept: INTERNAL MEDICINE | Facility: CLINIC | Age: 68
End: 2018-08-31
Payer: MEDICARE

## 2018-08-31 VITALS
DIASTOLIC BLOOD PRESSURE: 75 MMHG | TEMPERATURE: 97 F | SYSTOLIC BLOOD PRESSURE: 111 MMHG | HEIGHT: 71 IN | HEART RATE: 73 BPM | BODY MASS INDEX: 27.12 KG/M2 | OXYGEN SATURATION: 98 % | WEIGHT: 193.69 LBS

## 2018-08-31 DIAGNOSIS — Z12.31 ENCOUNTER FOR SCREENING MAMMOGRAM FOR BREAST CANCER: ICD-10-CM

## 2018-08-31 DIAGNOSIS — E11.69 HYPERLIPIDEMIA ASSOCIATED WITH TYPE 2 DIABETES MELLITUS: ICD-10-CM

## 2018-08-31 DIAGNOSIS — E78.5 HYPERLIPIDEMIA ASSOCIATED WITH TYPE 2 DIABETES MELLITUS: ICD-10-CM

## 2018-08-31 DIAGNOSIS — E11.9 TYPE 2 DIABETES MELLITUS WITHOUT COMPLICATION, WITHOUT LONG-TERM CURRENT USE OF INSULIN: Primary | ICD-10-CM

## 2018-08-31 DIAGNOSIS — L30.8 PRURITIC DERMATITIS: ICD-10-CM

## 2018-08-31 LAB
ALBUMIN/CREAT UR: 6.7 UG/MG
CREAT UR-MCNC: 180 MG/DL
MICROALBUMIN UR DL<=1MG/L-MCNC: 12 UG/ML

## 2018-08-31 PROCEDURE — 99999 PR PBB SHADOW E&M-EST. PATIENT-LVL III: CPT | Mod: PBBFAC,,, | Performed by: FAMILY MEDICINE

## 2018-08-31 PROCEDURE — 99214 OFFICE O/P EST MOD 30 MIN: CPT | Mod: S$PBB,,, | Performed by: FAMILY MEDICINE

## 2018-08-31 PROCEDURE — 99213 OFFICE O/P EST LOW 20 MIN: CPT | Mod: PBBFAC,PO | Performed by: FAMILY MEDICINE

## 2018-08-31 PROCEDURE — 82043 UR ALBUMIN QUANTITATIVE: CPT

## 2018-08-31 RX ORDER — INSULIN PUMP SYRINGE, 3 ML
EACH MISCELLANEOUS
Qty: 1 EACH | Refills: 0 | Status: SHIPPED | OUTPATIENT
Start: 2018-08-31

## 2018-08-31 RX ORDER — HYDROXYZINE HYDROCHLORIDE 25 MG/1
25 TABLET, FILM COATED ORAL NIGHTLY PRN
Qty: 30 TABLET | Refills: 0 | Status: SHIPPED | OUTPATIENT
Start: 2018-08-31 | End: 2018-09-28 | Stop reason: SDUPTHER

## 2018-08-31 RX ORDER — LANCETS
EACH MISCELLANEOUS
Qty: 100 EACH | Refills: 4 | Status: SHIPPED | OUTPATIENT
Start: 2018-08-31

## 2018-08-31 NOTE — PATIENT INSTRUCTIONS
Try Zyrtec (cetirazine) 10mg daily in AM and hydroxyzine 25mg at night for itch.    See your dermatologist.

## 2018-08-31 NOTE — PROGRESS NOTES
Subjective:       Patient ID: Ynes Diaz is a 68 y.o. female.    Chief Complaint: Follow-up and Rash (on left arm)    Here for recheck on her type 2 diabetes, well controlled on 3 oral medications, hyperlipidemia, familial peripheral neuropathy (sees neuro).  She is not on an ACE-inhibitor or an Arb and she does not have elevated blood pressure or microalbuminuria.  Lab Results       Component                Value               Date                       WBC                      13.84 (H)           08/21/2018                 HGB                      14.2                08/21/2018                 HCT                      44.2                08/21/2018                 PLT                      297                 08/21/2018                 CHOL                     171                 05/02/2018                   TRIG                     214                 05/02/2018                 HDL                      52                  05/02/2018                 LDLCALC                  76                  05/02/2018                 ALT                      13                  12/06/2017                 AST                      13                  12/06/2017                 NA                       138                 08/21/2018                 K                        5.2 (H)             08/21/2018                 CL                       103                 08/21/2018                 CALCIUM                  10.6 (H)            08/21/2018                 CREATININE               0.9                 08/21/2018                 BUN                      14                  08/21/2018                 CO2                      24                  08/21/2018                 GLU                      125 (H)             08/21/2018                 ESTGFRAFRICA             >60.0               08/21/2018                 EGFRNONAA                >60.0               08/21/2018                 HGBA1C                   6.0 (H)              08/21/2018                 MICALBCREAT              6.3                 12/06/2017            Last labs are reviewed.  We will go ahead and obtain her microalbumin today.  She sts her A1C is elevated compared to prior 5.5s due to two courses steroids this month.    She was seen for rash to face 8/11/18 and given IM steroid plus pred course - face still problem when seen by Dr Novoa 8/20/18 for rash to arms and legs felt to be poison ivy and given medrol dose pack. Those lesions resolved but then noted rash to her left arm and new lesions appearing to limbs. They do not come and go. They are itchy. She notes one to left knee started yesterday.    She got a TdaP 8/26/18 due to new grandbaby expected.      Diabetes   She presents for her follow-up diabetic visit. She has type 2 diabetes mellitus. Her disease course has been stable. There are no hypoglycemic associated symptoms. Associated symptoms include foot paresthesias (familial peripheral neuropathy). There are no hypoglycemic complications. Current diabetic treatment includes oral agent (triple therapy). Her weight is stable. (No meter currently - rx today) An ACE inhibitor/angiotensin II receptor blocker is not being taken. Eye exam is current (has appt mid Sept).     Review of Systems   Cardiovascular: Negative for palpitations.   Skin: Positive for rash.   Psychiatric/Behavioral: Negative for sleep disturbance.       Objective:      Physical Exam   Constitutional: She is oriented to person, place, and time. She appears well-developed and well-nourished.   HENT:   Head: Normocephalic and atraumatic.   Right Ear: External ear normal.   Left Ear: External ear normal.   Mouth/Throat: Oropharynx is clear and moist. No oropharyngeal exudate.   Neck: Normal range of motion. Neck supple.   Cardiovascular: Normal rate, regular rhythm and normal heart sounds.   Pulses:       Dorsalis pedis pulses are 2+ on the right side, and 2+ on the left side.        Posterior  tibial pulses are 1+ on the right side, and 1+ on the left side.   Pulmonary/Chest: Effort normal and breath sounds normal.   Abdominal: Soft. Bowel sounds are normal. She exhibits no distension. There is no tenderness.   Musculoskeletal:        Right foot: There is normal range of motion and no deformity.        Left foot: There is normal range of motion and no deformity.   Feet:   Right Foot:   Protective Sensation: 10 sites tested. 10 sites sensed.   Skin Integrity: Negative for ulcer or skin breakdown.   Left Foot:   Protective Sensation: 10 sites tested. 10 sites sensed.   Skin Integrity: Negative for ulcer or skin breakdown.   Neurological: She is alert and oriented to person, place, and time.   Skin: Skin is warm and dry.   Psychiatric: She has a normal mood and affect. Her behavior is normal.         Assessment/Plan:     1. Type 2 diabetes mellitus without complication, without long-term current use of insulin  Microalbumin/creatinine urine ratio    blood-glucose meter kit    lancets Misc    blood sugar diagnostic Strp    Hemoglobin A1c    Comprehensive metabolic panel    CBC auto differential   2. Hyperlipidemia associated with type 2 diabetes mellitus  Lipid panel   3. Encounter for screening mammogram for breast cancer  Mammo Digital Screening Bilat with CAD   4. Pruritic dermatitis  hydrOXYzine HCl (ATARAX) 25 MG tablet    Well controlled type 2 diabetes on current meds.  Well controlled hyperlipidemia on statin.  No hypertension present.  She sees derm - Dr Downing and will check with her re rash - take zyrtec am and atarax pm.  Recheck 6 months with labs.  ROIs signed for eye exam (appt scheduled for 9/17/18) and will be held, also for TdaP immunization received at [employer] this year.

## 2018-09-18 ENCOUNTER — HOSPITAL ENCOUNTER (OUTPATIENT)
Dept: RADIOLOGY | Facility: HOSPITAL | Age: 68
Discharge: HOME | End: 2018-09-18
Attending: FAMILY MEDICINE
Payer: MEDICARE

## 2018-09-18 VITALS — HEIGHT: 71 IN | WEIGHT: 193 LBS | BODY MASS INDEX: 27.02 KG/M2

## 2018-09-18 DIAGNOSIS — Z12.31 ENCOUNTER FOR SCREENING MAMMOGRAM FOR BREAST CANCER: ICD-10-CM

## 2018-09-18 PROCEDURE — 77063 BREAST TOMOSYNTHESIS BI: CPT | Mod: 26,,, | Performed by: RADIOLOGY

## 2018-09-18 PROCEDURE — 77067 SCR MAMMO BI INCL CAD: CPT | Mod: 26,,, | Performed by: RADIOLOGY

## 2018-09-18 PROCEDURE — 77067 SCR MAMMO BI INCL CAD: CPT | Mod: TC

## 2018-09-28 DIAGNOSIS — L30.8 PRURITIC DERMATITIS: ICD-10-CM

## 2018-09-28 RX ORDER — HYDROXYZINE HYDROCHLORIDE 25 MG/1
TABLET, FILM COATED ORAL
Qty: 30 TABLET | Refills: 0 | Status: SHIPPED | OUTPATIENT
Start: 2018-09-28 | End: 2019-03-01 | Stop reason: ALTCHOICE

## 2018-11-14 DIAGNOSIS — E11.9 DIABETES MELLITUS TYPE 2, CONTROLLED: ICD-10-CM

## 2018-11-14 RX ORDER — METFORMIN HYDROCHLORIDE 500 MG/1
TABLET, EXTENDED RELEASE ORAL
Qty: 360 TABLET | Refills: 3 | Status: SHIPPED | OUTPATIENT
Start: 2018-11-14 | End: 2019-11-09 | Stop reason: SDUPTHER

## 2019-02-11 DIAGNOSIS — F32.A DEPRESSION: ICD-10-CM

## 2019-02-11 DIAGNOSIS — E78.5 HYPERLIPIDEMIA, UNSPECIFIED HYPERLIPIDEMIA TYPE: ICD-10-CM

## 2019-02-11 RX ORDER — LINAGLIPTIN 5 MG/1
TABLET, FILM COATED ORAL
Qty: 90 TABLET | Refills: 3 | Status: SHIPPED | OUTPATIENT
Start: 2019-02-11 | End: 2020-02-17

## 2019-02-11 RX ORDER — ATORVASTATIN CALCIUM 10 MG/1
TABLET, FILM COATED ORAL
Qty: 90 TABLET | Refills: 3 | Status: SHIPPED | OUTPATIENT
Start: 2019-02-11 | End: 2020-02-08

## 2019-02-11 RX ORDER — FLUOXETINE HYDROCHLORIDE 40 MG/1
CAPSULE ORAL
Qty: 90 CAPSULE | Refills: 3 | Status: SHIPPED | OUTPATIENT
Start: 2019-02-11 | End: 2020-02-08

## 2019-02-25 ENCOUNTER — CLINICAL SUPPORT (OUTPATIENT)
Dept: INTERNAL MEDICINE | Facility: CLINIC | Age: 69
End: 2019-02-25
Payer: MEDICARE

## 2019-02-25 ENCOUNTER — PATIENT MESSAGE (OUTPATIENT)
Dept: ADMINISTRATIVE | Facility: OTHER | Age: 69
End: 2019-02-25

## 2019-02-25 DIAGNOSIS — E78.5 HYPERLIPIDEMIA ASSOCIATED WITH TYPE 2 DIABETES MELLITUS: ICD-10-CM

## 2019-02-25 DIAGNOSIS — E11.9 TYPE 2 DIABETES MELLITUS: ICD-10-CM

## 2019-02-25 DIAGNOSIS — E11.9 TYPE 2 DIABETES MELLITUS WITHOUT COMPLICATION, WITHOUT LONG-TERM CURRENT USE OF INSULIN: ICD-10-CM

## 2019-02-25 DIAGNOSIS — E11.69 HYPERLIPIDEMIA ASSOCIATED WITH TYPE 2 DIABETES MELLITUS: ICD-10-CM

## 2019-02-25 LAB
ALBUMIN SERPL BCP-MCNC: 3.6 G/DL
ALP SERPL-CCNC: 71 U/L
ALT SERPL W/O P-5'-P-CCNC: 17 U/L
ANION GAP SERPL CALC-SCNC: 9 MMOL/L
AST SERPL-CCNC: 16 U/L
BASOPHILS # BLD AUTO: 0.05 K/UL
BASOPHILS NFR BLD: 0.6 %
BILIRUB SERPL-MCNC: 0.5 MG/DL
BUN SERPL-MCNC: 16 MG/DL
CALCIUM SERPL-MCNC: 9.8 MG/DL
CHLORIDE SERPL-SCNC: 107 MMOL/L
CHOLEST SERPL-MCNC: 130 MG/DL
CHOLEST/HDLC SERPL: 3.3 {RATIO}
CO2 SERPL-SCNC: 26 MMOL/L
CREAT SERPL-MCNC: 0.7 MG/DL
DIFFERENTIAL METHOD: ABNORMAL
EOSINOPHIL # BLD AUTO: 0.2 K/UL
EOSINOPHIL NFR BLD: 2.6 %
ERYTHROCYTE [DISTWIDTH] IN BLOOD BY AUTOMATED COUNT: 14 %
EST. GFR  (AFRICAN AMERICAN): >60 ML/MIN/1.73 M^2
EST. GFR  (NON AFRICAN AMERICAN): >60 ML/MIN/1.73 M^2
ESTIMATED AVG GLUCOSE: 134 MG/DL
GLUCOSE SERPL-MCNC: 110 MG/DL
HBA1C MFR BLD HPLC: 6.3 %
HCT VFR BLD AUTO: 41.5 %
HDLC SERPL-MCNC: 39 MG/DL
HDLC SERPL: 30 %
HGB BLD-MCNC: 12.9 G/DL
IMM GRANULOCYTES # BLD AUTO: 0.02 K/UL
IMM GRANULOCYTES NFR BLD AUTO: 0.2 %
LDLC SERPL CALC-MCNC: 53.4 MG/DL
LYMPHOCYTES # BLD AUTO: 1.7 K/UL
LYMPHOCYTES NFR BLD: 19.8 %
MCH RBC QN AUTO: 28 PG
MCHC RBC AUTO-ENTMCNC: 31.1 G/DL
MCV RBC AUTO: 90 FL
MONOCYTES # BLD AUTO: 0.6 K/UL
MONOCYTES NFR BLD: 6.5 %
NEUTROPHILS # BLD AUTO: 6.2 K/UL
NEUTROPHILS NFR BLD: 70.3 %
NONHDLC SERPL-MCNC: 91 MG/DL
NRBC BLD-RTO: 0 /100 WBC
PLATELET # BLD AUTO: 206 K/UL
PMV BLD AUTO: 12.3 FL
POTASSIUM SERPL-SCNC: 4.2 MMOL/L
PROT SERPL-MCNC: 6.6 G/DL
RBC # BLD AUTO: 4.6 M/UL
SODIUM SERPL-SCNC: 142 MMOL/L
TRIGL SERPL-MCNC: 188 MG/DL
WBC # BLD AUTO: 8.81 K/UL

## 2019-02-25 PROCEDURE — 80061 LIPID PANEL: CPT

## 2019-02-25 PROCEDURE — 85025 COMPLETE CBC W/AUTO DIFF WBC: CPT

## 2019-02-25 PROCEDURE — 80053 COMPREHEN METABOLIC PANEL: CPT

## 2019-02-25 PROCEDURE — 99999 PR PBB SHADOW E&M-EST. PATIENT-LVL I: ICD-10-PCS | Mod: PBBFAC,,,

## 2019-02-25 PROCEDURE — 99211 OFF/OP EST MAY X REQ PHY/QHP: CPT | Mod: PBBFAC,PO

## 2019-02-25 PROCEDURE — 83036 HEMOGLOBIN GLYCOSYLATED A1C: CPT

## 2019-02-25 PROCEDURE — 99999 PR PBB SHADOW E&M-EST. PATIENT-LVL I: CPT | Mod: PBBFAC,,,

## 2019-03-01 ENCOUNTER — OFFICE VISIT (OUTPATIENT)
Dept: INTERNAL MEDICINE | Facility: CLINIC | Age: 69
End: 2019-03-01
Payer: MEDICARE

## 2019-03-01 VITALS
DIASTOLIC BLOOD PRESSURE: 75 MMHG | WEIGHT: 193.69 LBS | BODY MASS INDEX: 27.01 KG/M2 | HEART RATE: 82 BPM | TEMPERATURE: 97 F | SYSTOLIC BLOOD PRESSURE: 121 MMHG | OXYGEN SATURATION: 96 %

## 2019-03-01 DIAGNOSIS — E11.9 TYPE 2 DIABETES MELLITUS WITHOUT COMPLICATION, WITHOUT LONG-TERM CURRENT USE OF INSULIN: Primary | ICD-10-CM

## 2019-03-01 DIAGNOSIS — E11.69 HYPERLIPIDEMIA ASSOCIATED WITH TYPE 2 DIABETES MELLITUS: ICD-10-CM

## 2019-03-01 DIAGNOSIS — E78.5 HYPERLIPIDEMIA ASSOCIATED WITH TYPE 2 DIABETES MELLITUS: ICD-10-CM

## 2019-03-01 PROCEDURE — 99999 PR PBB SHADOW E&M-EST. PATIENT-LVL III: ICD-10-PCS | Mod: PBBFAC,,, | Performed by: FAMILY MEDICINE

## 2019-03-01 PROCEDURE — 99213 OFFICE O/P EST LOW 20 MIN: CPT | Mod: S$PBB,,, | Performed by: FAMILY MEDICINE

## 2019-03-01 PROCEDURE — 99999 PR PBB SHADOW E&M-EST. PATIENT-LVL III: CPT | Mod: PBBFAC,,, | Performed by: FAMILY MEDICINE

## 2019-03-01 PROCEDURE — 99213 OFFICE O/P EST LOW 20 MIN: CPT | Mod: PBBFAC,PO | Performed by: FAMILY MEDICINE

## 2019-03-01 PROCEDURE — 99213 PR OFFICE/OUTPT VISIT, EST, LEVL III, 20-29 MIN: ICD-10-PCS | Mod: S$PBB,,, | Performed by: FAMILY MEDICINE

## 2019-03-01 NOTE — PROGRESS NOTES
Subjective:       Patient ID: Ynes Diaz is a 69 y.o. female.    Chief Complaint: 6 mth f/u on diabetes    Patient with type 2 diabetes, hyperlipidemia here for scheduled 6 month recheck with recent labs. Lab Results       Component                Value               Date                       WBC                      8.81                02/25/2019                 HGB                      12.9                02/25/2019                 HCT                      41.5                02/25/2019                 PLT                      206                 02/25/2019                 CHOL                     130                 02/25/2019                 TRIG                     188 (H)             02/25/2019                 HDL                      39 (L)              02/25/2019                 LDLCALC                  53.4 (L)            02/25/2019                 ALT                      17                  02/25/2019                 AST                      16                  02/25/2019                 NA                       142                 02/25/2019                 K                        4.2                 02/25/2019                 CL                       107                 02/25/2019                 CALCIUM                  9.8                 02/25/2019                 CREATININE               0.7                 02/25/2019                 BUN                      16                  02/25/2019                 CO2                      26                  02/25/2019                 GLU                      110                 02/25/2019                 ESTGFRAFRICA             >60.0               02/25/2019                 EGFRNONAA                >60.0               02/25/2019                 HGBA1C                   6.3 (H)             02/25/2019                 MICALBCREAT              6.7                 08/31/2018       A1c up from 6.0 to 6.3 still excellent control with maximum metformin.   Lipids also checked excellent control with atorvastatin 10 mg.   Blood pressure not elevated.  She has no microalbuminuria.    She has recently decided to move to be near her new grandson in [facility] - things are starting to come together. Feeling a little overwhelmed. Moving her four cats as well.  We will do her foot exam today so she is up to date.       Diabetes   She presents for her follow-up diabetic visit. She has type 2 diabetes mellitus. Her disease course has been stable. Hypoglycemia symptoms include hunger. (If is late to eat - feels a little weak) Pertinent negatives for diabetes include no chest pain, no foot paresthesias, no foot ulcerations and no visual change. There are no hypoglycemic complications. Symptoms are stable. Pertinent negatives for diabetic complications include no nephropathy or peripheral neuropathy. Current diabetic treatment includes oral agent (dual therapy). She is compliant with treatment all of the time. Her weight is stable. She is following a generally healthy diet. Meal planning includes avoidance of concentrated sweets. Exercise: walks around the block for 30 min 3 x week. Her breakfast blood glucose range is generally 110-130 mg/dl. (Infrequent checks) An ACE inhibitor/angiotensin II receptor blocker is not being taken. Eye exam is current.     Review of Systems   Respiratory: Negative for shortness of breath.    Cardiovascular: Negative for chest pain, palpitations and leg swelling.   Psychiatric/Behavioral: Negative for sleep disturbance.       Objective:      Physical Exam   Constitutional: She is oriented to person, place, and time. She appears well-developed and well-nourished.   HENT:   Head: Normocephalic and atraumatic.   Right Ear: External ear normal.   Left Ear: External ear normal.   Mouth/Throat: Oropharynx is clear and moist. No oropharyngeal exudate.   Neck: Normal range of motion. Neck supple.   Cardiovascular: Normal rate, regular rhythm and normal  heart sounds.   Pulses:       Dorsalis pedis pulses are 2+ on the right side, and 2+ on the left side.        Posterior tibial pulses are 1+ on the right side, and 1+ on the left side.   Pulmonary/Chest: Effort normal and breath sounds normal.   Abdominal: Soft. Bowel sounds are normal. She exhibits no distension. There is no tenderness.   Musculoskeletal:        Right foot: There is normal range of motion and no deformity.        Left foot: There is normal range of motion and no deformity.   Feet:   Right Foot:   Protective Sensation: 10 sites tested. 10 sites sensed.   Skin Integrity: Negative for ulcer or skin breakdown.   Left Foot:   Protective Sensation: 10 sites tested. 10 sites sensed.   Skin Integrity: Negative for ulcer or skin breakdown.   Neurological: She is alert and oriented to person, place, and time.   Skin: Skin is warm and dry.   Psychiatric: She has a normal mood and affect. Her behavior is normal.         Assessment/Plan:     1. Type 2 diabetes mellitus without complication, without long-term current use of insulin  Hemoglobin A1c    Basic metabolic panel    Microalbumin/creatinine urine ratio   2. Hyperlipidemia associated with type 2 diabetes mellitus     continue tradjenta and metformin - recheck 6 months if still here - records printed to facilitate continuity of care.

## 2019-09-12 ENCOUNTER — PES CALL (OUTPATIENT)
Dept: ADMINISTRATIVE | Facility: CLINIC | Age: 69
End: 2019-09-12

## 2019-11-09 ENCOUNTER — TELEPHONE (OUTPATIENT)
Dept: INTERNAL MEDICINE | Facility: CLINIC | Age: 69
End: 2019-11-09

## 2019-11-09 DIAGNOSIS — E11.9 DIABETES MELLITUS TYPE 2, CONTROLLED: ICD-10-CM

## 2019-11-10 RX ORDER — METFORMIN HYDROCHLORIDE 500 MG/1
TABLET, EXTENDED RELEASE ORAL
Qty: 360 TABLET | Refills: 4 | Status: SHIPPED | OUTPATIENT
Start: 2019-11-10

## 2020-02-08 DIAGNOSIS — E11.9 TYPE 2 DIABETES MELLITUS WITHOUT COMPLICATION, WITHOUT LONG-TERM CURRENT USE OF INSULIN: Primary | ICD-10-CM

## 2020-02-08 DIAGNOSIS — F32.A DEPRESSION: ICD-10-CM

## 2020-02-08 DIAGNOSIS — E78.5 HYPERLIPIDEMIA, UNSPECIFIED HYPERLIPIDEMIA TYPE: ICD-10-CM

## 2020-02-08 RX ORDER — ATORVASTATIN CALCIUM 10 MG/1
10 TABLET, FILM COATED ORAL DAILY
Qty: 90 TABLET | Refills: 0 | Status: SHIPPED | OUTPATIENT
Start: 2020-02-08 | End: 2020-05-19

## 2020-02-08 RX ORDER — FLUOXETINE HYDROCHLORIDE 40 MG/1
CAPSULE ORAL
Qty: 90 CAPSULE | Refills: 0 | Status: SHIPPED | OUTPATIENT
Start: 2020-02-08 | End: 2020-05-19

## 2020-02-09 NOTE — TELEPHONE ENCOUNTER
Rx sent.  Due for lab, then visit.  Please schedule both.  Note labs include A1C, microalbumin, lipids, CMP, CBC.  Last labs/visit were a year ago.

## 2020-02-10 NOTE — TELEPHONE ENCOUNTER
Spoke to the patient and was advised that she has moved to [place] and has been up there since December.

## 2020-05-12 DIAGNOSIS — F32.A DEPRESSION: ICD-10-CM

## 2020-05-12 DIAGNOSIS — E78.5 HYPERLIPIDEMIA, UNSPECIFIED HYPERLIPIDEMIA TYPE: ICD-10-CM

## 2020-05-12 NOTE — TELEPHONE ENCOUNTER
Leave in staff box until patient contacted.  This will remind staff to continue to reach out to call

## 2020-05-12 NOTE — TELEPHONE ENCOUNTER
Patient has not been seen in over a year.  Requesting refills  Tried to contact last year, for 6 month follow up  Never called back    Need contact and confirmation of appt prior to refills

## 2020-05-18 NOTE — TELEPHONE ENCOUNTER
Patient states she moved to [place] a year ago and will tell the pharmacy again to change the PCP on file. Please disregard the refill request.

## 2020-05-19 ENCOUNTER — TELEPHONE (OUTPATIENT)
Dept: INTERNAL MEDICINE | Facility: CLINIC | Age: 70
End: 2020-05-19

## 2020-05-19 RX ORDER — ATORVASTATIN CALCIUM 10 MG/1
10 TABLET, FILM COATED ORAL DAILY
Qty: 90 TABLET | Refills: 0 | Status: SHIPPED | OUTPATIENT
Start: 2020-05-19

## 2020-05-19 RX ORDER — FLUOXETINE HYDROCHLORIDE 40 MG/1
CAPSULE ORAL
Qty: 90 CAPSULE | Refills: 0 | Status: SHIPPED | OUTPATIENT
Start: 2020-05-19

## 2020-05-19 NOTE — TELEPHONE ENCOUNTER
Please write the patient a letter stating I can not continue to prescribe her medications for her out of state.  She needs to establish with another doctor and have her pharmacy request refills from her local doctor.  Ninety day fills have been sent  No further refills.

## 2020-05-19 NOTE — TELEPHONE ENCOUNTER
Patient was unaware that the pharmacy sent the request to our office. Patient did find a new PCP and did ask the pharmacy to change all prescriptions to new PCP.

## 2020-08-05 ENCOUNTER — PATIENT OUTREACH (OUTPATIENT)
Dept: ADMINISTRATIVE | Facility: HOSPITAL | Age: 70
End: 2020-08-05

## 2020-08-05 NOTE — PROGRESS NOTES
[facility] Report-Patients not seen in a year.    Called to discuss need for annual and follow up diabetes. States she moved out of state last year and has new pcp.  Chart updated, orders cancelled.